# Patient Record
Sex: FEMALE | Race: WHITE
[De-identification: names, ages, dates, MRNs, and addresses within clinical notes are randomized per-mention and may not be internally consistent; named-entity substitution may affect disease eponyms.]

---

## 2018-01-18 ENCOUNTER — HOSPITAL ENCOUNTER (INPATIENT)
Dept: HOSPITAL 54 - ER | Age: 43
LOS: 1 days | Discharge: LEFT BEFORE DISCHARGE | DRG: 103 | End: 2018-01-19
Attending: NURSE PRACTITIONER | Admitting: NURSE PRACTITIONER
Payer: COMMERCIAL

## 2018-01-18 VITALS — WEIGHT: 160 LBS | HEIGHT: 67 IN | BODY MASS INDEX: 25.11 KG/M2

## 2018-01-18 VITALS — DIASTOLIC BLOOD PRESSURE: 74 MMHG | SYSTOLIC BLOOD PRESSURE: 121 MMHG

## 2018-01-18 DIAGNOSIS — R51: Primary | ICD-10-CM

## 2018-01-18 DIAGNOSIS — Z98.62: ICD-10-CM

## 2018-01-18 DIAGNOSIS — Z79.899: ICD-10-CM

## 2018-01-18 DIAGNOSIS — D68.59: ICD-10-CM

## 2018-01-18 DIAGNOSIS — E78.5: ICD-10-CM

## 2018-01-18 DIAGNOSIS — Z79.01: ICD-10-CM

## 2018-01-18 DIAGNOSIS — Z86.718: ICD-10-CM

## 2018-01-18 DIAGNOSIS — Z86.711: ICD-10-CM

## 2018-01-18 DIAGNOSIS — I20.9: ICD-10-CM

## 2018-01-18 LAB
APTT PPP: 31 SEC (ref 23–34)
BASOPHILS # BLD AUTO: 0.2 /CMM (ref 0–0.2)
BASOPHILS NFR BLD AUTO: 2.1 % (ref 0–2)
BUN SERPL-MCNC: 10 MG/DL (ref 7–18)
CALCIUM SERPL-MCNC: 9.1 MG/DL (ref 8.5–10.1)
CHLORIDE SERPL-SCNC: 100 MMOL/L (ref 98–107)
CO2 SERPL-SCNC: 27 MMOL/L (ref 21–32)
CREAT SERPL-MCNC: 0.9 MG/DL (ref 0.6–1.3)
EOSINOPHIL # BLD AUTO: 0.3 /CMM (ref 0–0.7)
EOSINOPHIL NFR BLD AUTO: 3.9 % (ref 0–6)
GLUCOSE SERPL-MCNC: 116 MG/DL (ref 74–106)
HCT VFR BLD AUTO: 48 % (ref 33–45)
HGB BLD-MCNC: 16.4 G/DL (ref 11.5–14.8)
INR PPP: 1.18 (ref 0.87–1.13)
LYMPHOCYTES NFR BLD AUTO: 2.7 /CMM (ref 0.8–4.8)
LYMPHOCYTES NFR BLD AUTO: 32.3 % (ref 20–44)
MCH RBC QN AUTO: 26 PG (ref 26–33)
MCHC RBC AUTO-ENTMCNC: 35 G/DL (ref 31–36)
MCV RBC AUTO: 76 FL (ref 82–100)
MONOCYTES NFR BLD AUTO: 0.5 /CMM (ref 0.1–1.3)
MONOCYTES NFR BLD AUTO: 6.4 % (ref 2–12)
NEUTROPHILS # BLD AUTO: 4.8 /CMM (ref 1.8–8.9)
NEUTROPHILS NFR BLD AUTO: 55.3 % (ref 43–81)
PLATELET # BLD AUTO: 279 /CMM (ref 150–450)
POTASSIUM SERPL-SCNC: 3.9 MMOL/L (ref 3.5–5.1)
RBC # BLD AUTO: 6.28 MIL/UL (ref 4–5.2)
RDW COEFFICIENT OF VARIATION: 13.8 (ref 11.5–15)
SODIUM SERPL-SCNC: 134 MMOL/L (ref 136–145)
TROPONIN I SERPL-MCNC: < 0.017 NG/ML (ref 0–0.06)
WBC NRBC COR # BLD AUTO: 8.5 K/UL (ref 4.3–11)

## 2018-01-18 PROCEDURE — A4606 OXYGEN PROBE USED W OXIMETER: HCPCS

## 2018-01-18 PROCEDURE — Z7610: HCPCS

## 2018-01-18 NOTE — NUR
MS RN NOTES

RECEIVED PT FROM ER VIA WHEELCHAIR, ABLE TO WALK AND TRANSFER HERSELF TO BED SAFELY. PT IS 
A/O X 4 , VERBALLY RESPONSIVE. SISTER AND  AT BEDSIDE. NO ACUTE DISTRESS, NO SOB 
NOTED. RESPIRATION IS EVEN AND UNLABORED. IV SITE ON RAC INTACT AND PATENT, NO INFILTRATION 
NOTED. PT STILL C/O HEAD ACHE 9/10 AT THIS TIME, ICE PACK GIVEN TO PT AT THIS TIME, WHILE 
AWAITING FOR ADMISSION ORDERS FROM DR. HERMILO HODGSON. , ALL NEEDS ATTENDED AND MET . KEPT 
COMFORTABLE. CALL LIGHT WITHIN REACH. SAFETY PRECAUTIONS OBSERVED. WILL CONT TO MONITOR.

## 2018-01-18 NOTE — NUR
41 yo female bb family. patient is alert and oriented x 4, patient was assisted 
to er bed. patient states she has been feeling ill for the past week, today she 
had a HA, pressure like 10/10 with a nose bleed, came to ED for eval. patient 
skin warm and dry, resp even an dunlabored. no one sided deficit noted at this 
time. awaiting orders from provider

## 2018-01-18 NOTE — NUR
PT RESTING COMFORTABLY AT THIS TIME,  AT BED SIDE. PT AROUSES EASILY, PT VERBALIZED  
THAT ICE PACK HELPED WITH THE PAIN. WILL CONT TO MONITOR.

## 2018-01-18 NOTE — NUR
emt at bed side for eval 

-------------------------------------------------------------------------------

Addendum: 01/18/18 at 1644 by MELQUIADES

-------------------------------------------------------------------------------

emt at bed side for EKG

## 2018-01-19 VITALS — DIASTOLIC BLOOD PRESSURE: 54 MMHG | SYSTOLIC BLOOD PRESSURE: 96 MMHG

## 2018-01-19 LAB
ALBUMIN SERPL BCP-MCNC: 2.8 G/DL (ref 3.4–5)
ALP SERPL-CCNC: 60 U/L (ref 46–116)
ALT SERPL W P-5'-P-CCNC: 29 U/L (ref 12–78)
AST SERPL W P-5'-P-CCNC: 15 U/L (ref 15–37)
BASOPHILS # BLD AUTO: 0 /CMM (ref 0–0.2)
BASOPHILS NFR BLD AUTO: 0.4 % (ref 0–2)
BILIRUB SERPL-MCNC: 0.3 MG/DL (ref 0.2–1)
BUN SERPL-MCNC: 9 MG/DL (ref 7–18)
CALCIUM SERPL-MCNC: 8.2 MG/DL (ref 8.5–10.1)
CHLORIDE SERPL-SCNC: 105 MMOL/L (ref 98–107)
CHOLEST SERPL-MCNC: 144 MG/DL (ref ?–200)
CO2 SERPL-SCNC: 25 MMOL/L (ref 21–32)
CREAT SERPL-MCNC: 0.7 MG/DL (ref 0.6–1.3)
EOSINOPHIL # BLD AUTO: 0.2 /CMM (ref 0–0.7)
EOSINOPHIL NFR BLD AUTO: 3.1 % (ref 0–6)
GLUCOSE SERPL-MCNC: 93 MG/DL (ref 74–106)
HCT VFR BLD AUTO: 40 % (ref 33–45)
HDLC SERPL-MCNC: 20 MG/DL (ref 40–60)
HGB BLD-MCNC: 13.5 G/DL (ref 11.5–14.8)
LDLC SERPL DIRECT ASSAY-MCNC: 116 MG/DL (ref 0–99)
LYMPHOCYTES NFR BLD AUTO: 2.7 /CMM (ref 0.8–4.8)
LYMPHOCYTES NFR BLD AUTO: 36.1 % (ref 20–44)
MAGNESIUM SERPL-MCNC: 1.9 MG/DL (ref 1.8–2.4)
MCH RBC QN AUTO: 26 PG (ref 26–33)
MCHC RBC AUTO-ENTMCNC: 34 G/DL (ref 31–36)
MCV RBC AUTO: 77 FL (ref 82–100)
MONOCYTES NFR BLD AUTO: 0.4 /CMM (ref 0.1–1.3)
MONOCYTES NFR BLD AUTO: 5.6 % (ref 2–12)
NEUTROPHILS # BLD AUTO: 4.1 /CMM (ref 1.8–8.9)
NEUTROPHILS NFR BLD AUTO: 54.8 % (ref 43–81)
PHOSPHATE SERPL-MCNC: 3.3 MG/DL (ref 2.5–4.9)
PLATELET # BLD AUTO: 237 /CMM (ref 150–450)
POTASSIUM SERPL-SCNC: 3.8 MMOL/L (ref 3.5–5.1)
PROT SERPL-MCNC: 6.6 G/DL (ref 6.4–8.2)
RBC # BLD AUTO: 5.13 MIL/UL (ref 4–5.2)
RDW COEFFICIENT OF VARIATION: 14.1 (ref 11.5–15)
SODIUM SERPL-SCNC: 136 MMOL/L (ref 136–145)
TRIGL SERPL-MCNC: 77 MG/DL (ref 30–150)
WBC NRBC COR # BLD AUTO: 7.5 K/UL (ref 4.3–11)

## 2018-01-19 NOTE — NUR
PT REFUSED TO SIGN THE CONSENT FOR MRA HEAD WITH CONTRAST, RISK AND BENEFITS EXPLAINED, PT 
A/O X 4. PT STILL REFUSED. WILL ENDORSE TO NEXT SHIFT ACCORDINGLY.

## 2018-01-19 NOTE — NUR
RN NOTES

 PATIENT BACK FROM MRI, BUT UNABLE TO DO MRI, BECAUSE PATIENT DOES  NOT FEEL LIKE SAFE  FOR 
HER TO DUE BECAUSE OF  HAVING STENT. PATIENT TALKED HER PRIMARY DOCTOR AND  REQUESTING TO 
D/C HOME. WILL FOLLOW  OUTPATIENT. NOTIFIED DR ALBRIGHT, AND DR VARGAS ABOUT PATIENT'S 
DECISION,  WAITING FOR RESPOND.

## 2018-01-19 NOTE — NUR
RN NOTES

RECEIVED PATIENT IN THE ROOM, A/O X4, HAS NO ACUTE RESPIRATORY DISTRESS, V/S STABLE, NO C/O 
PAIN AT THIS TIME,  ENCOURAGED TO EXPRESS FEELINGS AND CONCERNS. CALL LIGHT WITHIN ATILIO 
REACH, SAFETY PRECAUTION MAINTAINED ALL THE TIME. SISTER NEXT TO THE BED.

## 2018-01-19 NOTE — NUR
MS RN NOTES

PT IS A/O X 4 , VERBALLY RESPONSIVE. SISTER AT BEDSIDE. NO ACUTE DISTRESS, NO SOB NOTED. 
RESPIRATION IS EVEN AND UNLABORED. IV SITE ON RAC INTACT AND PATENT, NO INFILTRATION NOTED. 
IVF INFUSING WELL. PT VERBALIZED THAT SHE FEELS BETTER COMPARED LAST NIGHT. ALL NEEDS 
ATTENDED AND MET . KEPT COMFORTABLE. CALL LIGHT WITHIN REACH. SAFETY PRECAUTIONS OBSERVED. 
WILL ENDORSE TO NEXT SHIFT FOR PAMELA.

## 2018-01-19 NOTE — NUR
DISCHARGE NOTES

 PATIENT D/C AMA AT THIS TIME, SIGN AMA FORM,PROVIDED DISCHARGE PAPERWORK. BELONGING WITH 
THE PATIENT, ESCORTED PATIENT TO THE LOBBY FOR SAFETY.

## 2018-01-19 NOTE — NUR
RN NOTES

 BY DR ALBRIGHT, AND DR VARGAS, PATIENT WILL HAVE A MRI . PATIENT STATE "I WILL D/C HOME 
AMA AND WILL FOLLOW MY PRIMARY  DOCTOR".AMA.  BOTH DOCTORS NOTIFIED ABOUT PATIENT DECISION, 
CONTINUED MONITORING.

## 2018-01-19 NOTE — NUR
RN NOTES

SEEN PATIENT BY DR VARGAS NEUROLOGIST PATIENT GOING TO HAVE A MRI BRAIN WITHOUT CONTRAST 
VENOGRAM. PATIENT SIGN CONSENT FORM. CONTINUED MONITORING.

## 2018-01-19 NOTE — NUR
RN NOTES

 PATIENT IN THE BED LYING, REFUSED PAIN MEDICATION AT THIS TIME, CALL LIGHT WITHIN TO REACH 
CONTINUED MONITORING.

## 2020-06-24 ENCOUNTER — HOSPITAL ENCOUNTER (EMERGENCY)
Dept: HOSPITAL 54 - ER | Age: 45
Discharge: HOME | End: 2020-06-24
Payer: COMMERCIAL

## 2020-06-24 VITALS — SYSTOLIC BLOOD PRESSURE: 132 MMHG | DIASTOLIC BLOOD PRESSURE: 78 MMHG

## 2020-06-24 VITALS — BODY MASS INDEX: 27.32 KG/M2 | HEIGHT: 66 IN | WEIGHT: 170 LBS

## 2020-06-24 DIAGNOSIS — D68.59: ICD-10-CM

## 2020-06-24 DIAGNOSIS — Z79.899: ICD-10-CM

## 2020-06-24 DIAGNOSIS — Z88.8: ICD-10-CM

## 2020-06-24 DIAGNOSIS — Z86.73: ICD-10-CM

## 2020-06-24 DIAGNOSIS — H60.91: Primary | ICD-10-CM

## 2020-06-24 DIAGNOSIS — Z86.718: ICD-10-CM

## 2020-06-24 DIAGNOSIS — Z98.890: ICD-10-CM

## 2020-12-05 ENCOUNTER — HOSPITAL ENCOUNTER (INPATIENT)
Dept: HOSPITAL 54 - ER | Age: 45
LOS: 1 days | Discharge: HOME | DRG: 206 | End: 2020-12-06
Attending: NURSE PRACTITIONER | Admitting: INTERNAL MEDICINE
Payer: COMMERCIAL

## 2020-12-05 VITALS — BODY MASS INDEX: 27.62 KG/M2 | WEIGHT: 176 LBS | HEIGHT: 67 IN

## 2020-12-05 DIAGNOSIS — Z91.018: ICD-10-CM

## 2020-12-05 DIAGNOSIS — Z86.73: ICD-10-CM

## 2020-12-05 DIAGNOSIS — Z98.82: ICD-10-CM

## 2020-12-05 DIAGNOSIS — I10: ICD-10-CM

## 2020-12-05 DIAGNOSIS — D68.59: ICD-10-CM

## 2020-12-05 DIAGNOSIS — Z79.899: ICD-10-CM

## 2020-12-05 DIAGNOSIS — Z86.718: ICD-10-CM

## 2020-12-05 DIAGNOSIS — Z79.01: ICD-10-CM

## 2020-12-05 DIAGNOSIS — Z88.8: ICD-10-CM

## 2020-12-05 DIAGNOSIS — Z86.711: ICD-10-CM

## 2020-12-05 DIAGNOSIS — M94.0: Primary | ICD-10-CM

## 2020-12-05 DIAGNOSIS — I25.10: ICD-10-CM

## 2020-12-05 DIAGNOSIS — Z98.62: ICD-10-CM

## 2020-12-05 DIAGNOSIS — Z88.4: ICD-10-CM

## 2020-12-05 LAB
BASOPHILS # BLD AUTO: 0.4 /CMM (ref 0–0.2)
BASOPHILS NFR BLD AUTO: 3 % (ref 0–2)
BUN SERPL-MCNC: 13 MG/DL (ref 7–18)
CALCIUM SERPL-MCNC: 9.1 MG/DL (ref 8.5–10.1)
CHLORIDE SERPL-SCNC: 103 MMOL/L (ref 98–107)
CO2 SERPL-SCNC: 28 MMOL/L (ref 21–32)
CREAT SERPL-MCNC: 1.2 MG/DL (ref 0.6–1.3)
EOSINOPHIL NFR BLD AUTO: 2.6 % (ref 0–6)
GLUCOSE SERPL-MCNC: 110 MG/DL (ref 74–106)
HCT VFR BLD AUTO: 48 % (ref 33–45)
HGB BLD-MCNC: 15.7 G/DL (ref 11.5–14.8)
LYMPHOCYTES NFR BLD AUTO: 25.1 % (ref 20–44)
LYMPHOCYTES NFR BLD AUTO: 3 /CMM (ref 0.8–4.8)
MCHC RBC AUTO-ENTMCNC: 33 G/DL (ref 31–36)
MCV RBC AUTO: 81 FL (ref 82–100)
MONOCYTES NFR BLD AUTO: 0.5 /CMM (ref 0.1–1.3)
MONOCYTES NFR BLD AUTO: 4 % (ref 2–12)
NEUTROPHILS # BLD AUTO: 7.7 /CMM (ref 1.8–8.9)
NEUTROPHILS NFR BLD AUTO: 65.3 % (ref 43–81)
PLATELET # BLD AUTO: 301 /CMM (ref 150–450)
POTASSIUM SERPL-SCNC: 4 MMOL/L (ref 3.5–5.1)
RBC # BLD AUTO: 5.9 MIL/UL (ref 4–5.2)
SODIUM SERPL-SCNC: 137 MMOL/L (ref 136–145)
WBC NRBC COR # BLD AUTO: 11.8 K/UL (ref 4.3–11)

## 2020-12-05 PROCEDURE — G0378 HOSPITAL OBSERVATION PER HR: HCPCS

## 2020-12-05 PROCEDURE — C9803 HOPD COVID-19 SPEC COLLECT: HCPCS

## 2020-12-06 VITALS — SYSTOLIC BLOOD PRESSURE: 117 MMHG | DIASTOLIC BLOOD PRESSURE: 67 MMHG

## 2020-12-06 VITALS — DIASTOLIC BLOOD PRESSURE: 70 MMHG | SYSTOLIC BLOOD PRESSURE: 110 MMHG

## 2020-12-06 VITALS — SYSTOLIC BLOOD PRESSURE: 94 MMHG | DIASTOLIC BLOOD PRESSURE: 63 MMHG

## 2020-12-06 VITALS — DIASTOLIC BLOOD PRESSURE: 70 MMHG | SYSTOLIC BLOOD PRESSURE: 111 MMHG

## 2020-12-06 VITALS — DIASTOLIC BLOOD PRESSURE: 63 MMHG | SYSTOLIC BLOOD PRESSURE: 94 MMHG

## 2020-12-06 VITALS — SYSTOLIC BLOOD PRESSURE: 117 MMHG | DIASTOLIC BLOOD PRESSURE: 76 MMHG

## 2020-12-06 LAB
BASOPHILS # BLD AUTO: 0 /CMM (ref 0–0.2)
BASOPHILS NFR BLD AUTO: 0.3 % (ref 0–2)
BUN SERPL-MCNC: 11 MG/DL (ref 7–18)
CALCIUM SERPL-MCNC: 8.4 MG/DL (ref 8.5–10.1)
CHLORIDE SERPL-SCNC: 106 MMOL/L (ref 98–107)
CHOLEST SERPL-MCNC: 158 MG/DL (ref ?–200)
CO2 SERPL-SCNC: 25 MMOL/L (ref 21–32)
CREAT SERPL-MCNC: 1 MG/DL (ref 0.6–1.3)
EOSINOPHIL NFR BLD AUTO: 3.2 % (ref 0–6)
GLUCOSE SERPL-MCNC: 109 MG/DL (ref 74–106)
HCT VFR BLD AUTO: 43 % (ref 33–45)
HDLC SERPL-MCNC: 25 MG/DL (ref 40–60)
HGB BLD-MCNC: 14.3 G/DL (ref 11.5–14.8)
LDLC SERPL DIRECT ASSAY-MCNC: 113 MG/DL (ref 0–99)
LYMPHOCYTES NFR BLD AUTO: 3 /CMM (ref 0.8–4.8)
LYMPHOCYTES NFR BLD AUTO: 32.8 % (ref 20–44)
MAGNESIUM SERPL-MCNC: 2.1 MG/DL (ref 1.8–2.4)
MCHC RBC AUTO-ENTMCNC: 34 G/DL (ref 31–36)
MCV RBC AUTO: 81 FL (ref 82–100)
MONOCYTES NFR BLD AUTO: 0.5 /CMM (ref 0.1–1.3)
MONOCYTES NFR BLD AUTO: 5.3 % (ref 2–12)
NEUTROPHILS # BLD AUTO: 5.3 /CMM (ref 1.8–8.9)
NEUTROPHILS NFR BLD AUTO: 58.4 % (ref 43–81)
PHOSPHATE SERPL-MCNC: 3.1 MG/DL (ref 2.5–4.9)
PLATELET # BLD AUTO: 247 /CMM (ref 150–450)
POTASSIUM SERPL-SCNC: 3.8 MMOL/L (ref 3.5–5.1)
RBC # BLD AUTO: 5.29 MIL/UL (ref 4–5.2)
SODIUM SERPL-SCNC: 139 MMOL/L (ref 136–145)
TRIGL SERPL-MCNC: 112 MG/DL (ref 30–150)
WBC NRBC COR # BLD AUTO: 9.1 K/UL (ref 4.3–11)

## 2020-12-06 RX ADMIN — Medication PRN MG: at 11:22

## 2020-12-06 RX ADMIN — Medication PRN MG: at 11:27

## 2020-12-06 NOTE — NUR
-------------------------------------------------------------------------------

             *** Note undone in [facility] - 12/05/20 at 2232 by ELIGIO ***             

-------------------------------------------------------------------------------

COVID SWABBED, SENT TO LAB.
-------------------------------------------------------------------------------

           *** Note undone in [facility] - 12/06/20 at 0052 by ANTOINE ***            

-------------------------------------------------------------------------------

REPORT GIVEN TO CARMEN PALMA PAMELA
[phone] MADDY, DAUGHTER.
ADMISSION PACKET GIVEN TO ADMITING.
CALLED RADIOLOGY REGARDING THE XRAY. RADIOLOGY WILL CALL [facility] REGARDING STAT 
READ ON THE CT. MD AWARE.
CALLED RN SUP FOR TELE BED
COVID RESULT: NEGATIVE
COVID SWABBED, SENT TO LAB.
MD AT BEDSIDE SPEAKING TO THE PT. PT NOTED SHE STILL HAS A 7/10 CP. AWITING FOR 
ORDERS. WILL CONTINUE TO MONITOR.
MS/RN   2D Echo



2D echo carried out at bedside.
MS/RN   Consent



Patient consented for CTCA, consent placed in front of chart.
MS/RN   Discharge instructions



Patient to be discharged to home today by Dr Pang and follow up with Dr Martinez and GI in one 
week. Provided with office address and telephone number of Dr Martinez, patient stated that she 
would call to make appointment tomorrow.

Heplock, name bands and tele removed. All personal belonging accounted for and signed for on 
personal belongings list. 

Provided with copy of medical record, including all testing completed on this admission. 
Exit care printed and signed, also provided with copy.

Educated as to the importance of taking all medications as ordered, informed that no new 
meds were prescribed at this time and no new prescriptions were needed.

Escorted to main lobby by CNA via wheelchair.
MS/RN   Opening note



Patient received from night shift.

A/O X4, vital signs stable, denies pain at this time, only complaining of nausea, zofran to 
be administered. Heplock to right AC flushing well with normal saline, no signs of 
infiltration seen. Tele reading NSR.

All questions addressed and answered, safety measures in place. Will continue to monitor and 
ensure safety.
MS/RN   S/B Dr Martinez



Seen by Dr Martinez - orders noted and carried out.
MS/RN   S/B Dr Pang



Seen by Dr Pang - patient to be discharged to home later today if CTCA negative.
PT AAOX4. AMBULATORY WITH STAEDY GAIT. BIBSELF C/O CP X FEW HRS (2-3), 
RADIATING TO LT ARM, TOOK 2 NITRO SL, NO RELIEF. PT PLACED ON CARDIAC MONITOR 
AND PULSE OX. IV LINE ESTASBLISHED RAC 20G, BLOOD COLLECTED, AND SENT TO LAB. 
EMT AT BEDSIDE FOR EKG. PT NOTED TACHY , VSS. AWAITING FOR ORDERS. WILL 
CONTINUE TO MONITOR.
PT ASSIGNED TO [facility] [id number].
PT NOTED SINUS AT 98, VSS.
PT REASSIGNED TO [facility] -2
PT STATED SHE WAS NAUSEOUS, ZOFRAN 4MG IVP ORDERED BY MD.
PT TRANSFERED PER ACLS PROTOCOL
PT WHEELED TO THE CT.
RADIOLOGY AT BEDSIDE FOR XRAY
REPORT GIVEN TO MARGO MORENO FOR PAMELA
RN TELE CLOSING NOTES 

 PATIENT IN BED  ALERT AND ORIENTED X4, RESPIRATIONS EVEN AND UNLABORED WITH EQUAL RISE AND 
FALL OF CHEST,  PAIN MEDICATION EFFECTIVE AT THIS TIME, SLEPT WELL , IV SITE TO RIGHT AC 
#20G INTACT AND PATENT, NO REDNESS NO INFILTRATION PRESENT, DENIES ANY SKIN ISSUES STATES 
SKIN IS INTACT, VS WNL .DISCUSSED PLAN OF CARE, CALL LIGHT KEPT WITHIN REACH , ALL NEEDS 
ATTENDED AT THIS TIME, WILL CONTINUE TO MONITOR AND ATTEND TO NEEDS. NO CHANGES SINCE 
ARRIVAL TO UNIT,ON CARDIAC MONITOR SR 78.
RN TELE OPENING NOTES 

RECEIVED PATIENT FROM ER VIA RNEY SAFELY TRANSFERRED TO BED AMBULATORY STEADY, AWAKE ALERT 
AND ORIENTED X4, RESPIRATIONS EVEN AND UNLABORED WITH EQUAL RISE AND FALL OF CHEST, AT THIS 
TIME STATES SHE FEELS PRESSURE TO CHEST AND IT GOES TO LEFT ARM BUT RATES PAIN 5/10 STATES 
IT FEELS BETTER AND THE ZOFRAN HELPED, OFFERED PAIN MEDICATION , PATIENT AT THIS TIME DOES 
NOT WANT ANY, IV SITE TO RIGHT AC #20G INTACT AND PATENT, NO REDNESS NO INFILTRATION 
PRESENT, DENIES ANY SKIN ISSUES STATES SKIN IS INTACT, VS WNL , BELONGINGS LIST DONE, 
PATIENT HAS RING TO LEFT HAND BUT STATES SHE DECIDES TO  KEEP IT ON.DISCUSSED PLAN OF CARE, 
ORIENTED TO STAFF AND CALL LIGHT AND KEPT WITHIN REACH , ALL NEEDS ATTENDED AT THIS TIME, 
WILL CONTINUE TO MONITOR AND ATTEND TO NEEDS. ON CARDIAC MONITOR SR 77.
Report given to JOSH Echevarria for PAMELA. Pt's VSS.
SPOKE TO THE PT'S DAUGHTER REGARDING PT BEING ADMITTED, STATED SHE WILL BRING 
BACK THE MED LIST.
rn ms notes

 pt states she feels pain pressure like to left arm area, chest area after ambulating to the 
restroom.

vs assessed wnl, cardiac monitor, wnl sr .

norco prn offered patient agreed, prn given will continue to monitor for effectiveness, 
patient state she will try to go to sleep.
Comment: Recheck left ear at fu. Will biopsy if not resolved with ln2.
Detail Level: Simple

## 2021-04-17 ENCOUNTER — HOSPITAL ENCOUNTER (INPATIENT)
Dept: HOSPITAL 54 - ER | Age: 46
Discharge: LEFT BEFORE DISCHARGE | DRG: 866 | End: 2021-04-17
Attending: NURSE PRACTITIONER | Admitting: NURSE PRACTITIONER
Payer: COMMERCIAL

## 2021-04-17 VITALS — HEIGHT: 66 IN | BODY MASS INDEX: 28.28 KG/M2 | WEIGHT: 176 LBS

## 2021-04-17 VITALS — SYSTOLIC BLOOD PRESSURE: 134 MMHG | DIASTOLIC BLOOD PRESSURE: 79 MMHG

## 2021-04-17 DIAGNOSIS — E86.0: ICD-10-CM

## 2021-04-17 DIAGNOSIS — T50.B95A: ICD-10-CM

## 2021-04-17 DIAGNOSIS — Z88.8: ICD-10-CM

## 2021-04-17 DIAGNOSIS — R51.9: ICD-10-CM

## 2021-04-17 DIAGNOSIS — Z79.899: ICD-10-CM

## 2021-04-17 DIAGNOSIS — F41.9: ICD-10-CM

## 2021-04-17 DIAGNOSIS — T88.1XXA: Primary | ICD-10-CM

## 2021-04-17 DIAGNOSIS — I10: ICD-10-CM

## 2021-04-17 DIAGNOSIS — Z86.73: ICD-10-CM

## 2021-04-17 DIAGNOSIS — Z88.4: ICD-10-CM

## 2021-04-17 DIAGNOSIS — D68.59: ICD-10-CM

## 2021-04-17 DIAGNOSIS — Z95.5: ICD-10-CM
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
## 2021-04-17 DIAGNOSIS — Z20.822: ICD-10-CM
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
## 2021-04-17 DIAGNOSIS — Z91.048: ICD-10-CM

## 2021-04-17 DIAGNOSIS — Z86.711: ICD-10-CM

## 2021-04-17 DIAGNOSIS — Z79.01: ICD-10-CM

## 2021-04-17 DIAGNOSIS — Z86.718: ICD-10-CM

## 2021-04-17 DIAGNOSIS — Z91.018: ICD-10-CM

## 2021-04-17 LAB
ALBUMIN SERPL BCP-MCNC: 3.5 G/DL (ref 3.4–5)
ALP SERPL-CCNC: 72 U/L (ref 46–116)
ALT SERPL W P-5'-P-CCNC: 25 U/L (ref 12–78)
AST SERPL W P-5'-P-CCNC: 13 U/L (ref 15–37)
BASOPHILS # BLD AUTO: 0 /CMM (ref 0–0.2)
BASOPHILS NFR BLD AUTO: 0.5 % (ref 0–2)
BILIRUB DIRECT SERPL-MCNC: 0.1 MG/DL (ref 0–0.2)
BILIRUB SERPL-MCNC: 0.3 MG/DL (ref 0.2–1)
BUN SERPL-MCNC: 13 MG/DL (ref 7–18)
CALCIUM SERPL-MCNC: 8.2 MG/DL (ref 8.5–10.1)
CHLORIDE SERPL-SCNC: 103 MMOL/L (ref 98–107)
CHOLEST SERPL-MCNC: 184 MG/DL (ref ?–200)
CO2 SERPL-SCNC: 26 MMOL/L (ref 21–32)
CREAT SERPL-MCNC: 1 MG/DL (ref 0.6–1.3)
EOSINOPHIL NFR BLD AUTO: 2 % (ref 0–6)
GLUCOSE SERPL-MCNC: 110 MG/DL (ref 74–106)
HCT VFR BLD AUTO: 46 % (ref 33–45)
HDLC SERPL-MCNC: 33 MG/DL (ref 40–60)
HGB BLD-MCNC: 14.9 G/DL (ref 11.5–14.8)
LDLC SERPL DIRECT ASSAY-MCNC: 130 MG/DL (ref 0–99)
LYMPHOCYTES NFR BLD AUTO: 2.7 /CMM (ref 0.8–4.8)
LYMPHOCYTES NFR BLD AUTO: 29 % (ref 20–44)
MCHC RBC AUTO-ENTMCNC: 33 G/DL (ref 31–36)
MCV RBC AUTO: 82 FL (ref 82–100)
MONOCYTES NFR BLD AUTO: 0.4 /CMM (ref 0.1–1.3)
MONOCYTES NFR BLD AUTO: 4.7 % (ref 2–12)
NEUTROPHILS # BLD AUTO: 6 /CMM (ref 1.8–8.9)
NEUTROPHILS NFR BLD AUTO: 63.8 % (ref 43–81)
PLATELET # BLD AUTO: 252 /CMM (ref 150–450)
POTASSIUM SERPL-SCNC: 3.8 MMOL/L (ref 3.5–5.1)
PROT SERPL-MCNC: 7 G/DL (ref 6.4–8.2)
RBC # BLD AUTO: 5.57 MIL/UL (ref 4–5.2)
SODIUM SERPL-SCNC: 136 MMOL/L (ref 136–145)
TRIGL SERPL-MCNC: 162 MG/DL (ref 30–150)
WBC NRBC COR # BLD AUTO: 9.5 K/UL (ref 4.3–11)

## 2021-04-17 PROCEDURE — G0378 HOSPITAL OBSERVATION PER HR: HCPCS

## 2021-04-17 PROCEDURE — C9803 HOPD COVID-19 SPEC COLLECT: HCPCS

## 2021-04-17 NOTE — NUR
PATIENT ARRIVED FROM ER, AWAKE, A/O X4, NO S/S OF DISTRESS NOTED. CALL LIGHT WITHIN REACH. 
BED ALARM ON. BED IN LOWEST AND LOCKED POSITION. PATIENT ABLE TO MOVE ALL EXTREMETIES, WNL, 
NO WEAKNESS. PATIENT ABLE TO FOLLOW COMMANDS. ANSWERS ALL QUESTIONS. NOT CONFUSED. ABLE TO 
TURN TO SIDES. BEDREST, PATIENT IS AWARE, VERBALIZED UNDERSTANDING. PATIENT IS ABLE TO 
COMPREHEND.

## 2021-04-17 NOTE — NUR
INFORMED MD RE: CLARIFICATIONS OF NPO ORDER, PATIENT HAS PO MEDS ORDERS, AND PATIENT IS VERY 
HUNGRY SHE WANTS TO EAT. WAITING FOR MD'S RESPONSE.

## 2021-04-17 NOTE — NUR
EXPLAINED TO THE PATIENT THE IMPORTANCE AND BENEFITS OF STAYING. PATIENT VERBALIZED 
UNDERSTANDING. PATIENT STILL WANTS TO GO AMA.

## 2021-04-17 NOTE — NUR
MD RESPONDED THAT PATIENT NEEDS A SWALLOW EVAL FIRST BEFORE TAKING ANY PO, PATIENT MADE 
AWARE. PATIENT IS VERY UPSET AND STATED THAT SHE WANTS TO GO AMA, DR GALVEZ MADE AWARE.

## 2021-04-17 NOTE — NUR
PATIENT SIGNED THE AMA FORM AND ATTACHED TO THE CHART. CHARGE NURSE AWARE. 2 IV LEFT AC G2 
AND LEFT WRIST G20 REMOVED, NO BLEEDING NOTED. WRIST BAND REMOVED. PATIENT WENT DOWN VIA 
WHEELCHAIR ACCOMPANIED BY THE TONY CARRASCO.

## 2021-04-17 NOTE — NUR
SPOKE WITH  PT;S  WIFE  JULIA HENAO  [phone]  MD  SPEAKING  WITH WIFE  PT 
HAD  THE 2ND  VACCINE  PPFIZER  WAS  ABLE TO SLEEP TODAY  WEAK AND    HAD A 
SYNCOPE  WAS  BROUGHT IN

## 2021-04-17 NOTE — NUR
pt  rec'd to er  [facility]  pt able  to blink eyes  no verbal  ekg  done  iv  
started   left ac  18  called   st  joes  sent to ct  rolling  down  [facility] pt  
sat  upm straight  .  screamed  and started talking  and  crying    did  ct  
came back to  room  s another  iv  started left  hand  .  spoke to  md  at  st 
joes  pt was able  to  commucate.   cring  over  pain  in  head    labs  kunal  
sent to lab

## 2022-12-26 ENCOUNTER — HOSPITAL ENCOUNTER (EMERGENCY)
Dept: HOSPITAL 54 - ER | Age: 47
Discharge: LEFT BEFORE DISCHARGE | End: 2022-12-26
Payer: SELF-PAY

## 2022-12-26 VITALS — WEIGHT: 150 LBS | HEIGHT: 66 IN | BODY MASS INDEX: 24.11 KG/M2

## 2022-12-26 VITALS — DIASTOLIC BLOOD PRESSURE: 70 MMHG | SYSTOLIC BLOOD PRESSURE: 150 MMHG

## 2022-12-26 DIAGNOSIS — Z88.8: ICD-10-CM

## 2022-12-26 DIAGNOSIS — R06.02: ICD-10-CM

## 2022-12-26 DIAGNOSIS — Z86.73: ICD-10-CM

## 2022-12-26 DIAGNOSIS — Z86.718: ICD-10-CM

## 2022-12-26 DIAGNOSIS — D68.59: ICD-10-CM

## 2022-12-26 DIAGNOSIS — Z53.29: ICD-10-CM

## 2022-12-26 DIAGNOSIS — Z95.5: ICD-10-CM

## 2022-12-26 DIAGNOSIS — Z79.01: ICD-10-CM

## 2022-12-26 DIAGNOSIS — R07.9: Primary | ICD-10-CM

## 2022-12-26 DIAGNOSIS — F19.21: ICD-10-CM

## 2022-12-26 DIAGNOSIS — Z20.822: ICD-10-CM

## 2022-12-26 DIAGNOSIS — Z91.018: ICD-10-CM

## 2022-12-26 PROCEDURE — C9803 HOPD COVID-19 SPEC COLLECT: HCPCS

## 2022-12-26 PROCEDURE — 99284 EMERGENCY DEPT VISIT MOD MDM: CPT

## 2022-12-26 PROCEDURE — 93005 ELECTROCARDIOGRAM TRACING: CPT

## 2022-12-26 PROCEDURE — 87426 SARSCOV CORONAVIRUS AG IA: CPT

## 2022-12-26 PROCEDURE — 87804 INFLUENZA ASSAY W/OPTIC: CPT

## 2022-12-26 NOTE — NUR
Patient does not wish to proceed with medical care recommended by Dr. MURPHY. 
Patient given information related to possible complications, up to and 
including death, which could occur as a result of leaving the hospital at this 
time. Patient verbalizes understanding of risks involved due to leaving against 
medical advice. Patient has signed AMA form.